# Patient Record
Sex: MALE | Race: WHITE | Employment: OTHER | URBAN - METROPOLITAN AREA
[De-identification: names, ages, dates, MRNs, and addresses within clinical notes are randomized per-mention and may not be internally consistent; named-entity substitution may affect disease eponyms.]

---

## 2024-06-22 ENCOUNTER — HOSPITAL ENCOUNTER (EMERGENCY)
Age: 74
Discharge: HOME | End: 2024-06-22
Attending: EMERGENCY MEDICINE
Payer: COMMERCIAL

## 2024-06-22 VITALS
HEART RATE: 72 BPM | HEIGHT: 71 IN | RESPIRATION RATE: 18 BRPM | WEIGHT: 210 LBS | BODY MASS INDEX: 29.4 KG/M2 | DIASTOLIC BLOOD PRESSURE: 85 MMHG | SYSTOLIC BLOOD PRESSURE: 133 MMHG | OXYGEN SATURATION: 100 % | TEMPERATURE: 97.1 F

## 2024-06-22 DIAGNOSIS — K44.9 HIATAL HERNIA: ICD-10-CM

## 2024-06-22 DIAGNOSIS — N17.9 AKI (ACUTE KIDNEY INJURY) (HCC): ICD-10-CM

## 2024-06-22 DIAGNOSIS — K57.90 DIVERTICULOSIS: ICD-10-CM

## 2024-06-22 DIAGNOSIS — N20.1 LEFT URETERAL STONE: Primary | ICD-10-CM

## 2024-06-22 LAB
ALBUMIN SERPL-MCNC: 4.1 G/DL (ref 3.5–5.2)
ALP SERPL-CCNC: 82 U/L (ref 40–129)
ALT SERPL-CCNC: 14 U/L (ref 0–40)
ANION GAP SERPL CALCULATED.3IONS-SCNC: 11 MMOL/L (ref 7–16)
AST SERPL-CCNC: 19 U/L (ref 0–39)
BASOPHILS # BLD: 0.04 K/UL (ref 0–0.2)
BASOPHILS NFR BLD: 1 % (ref 0–2)
BILIRUB SERPL-MCNC: 0.3 MG/DL (ref 0–1.2)
BILIRUB UR QL STRIP: NEGATIVE
BUN SERPL-MCNC: 20 MG/DL (ref 6–23)
CALCIUM SERPL-MCNC: 8.9 MG/DL (ref 8.6–10.2)
CHLORIDE SERPL-SCNC: 106 MMOL/L (ref 98–107)
CLARITY UR: CLEAR
CO2 SERPL-SCNC: 23 MMOL/L (ref 22–29)
COLOR UR: YELLOW
CREAT SERPL-MCNC: 1.4 MG/DL (ref 0.7–1.2)
EKG ATRIAL RATE: 59 BPM
EKG P AXIS: 71 DEGREES
EKG P-R INTERVAL: 178 MS
EKG Q-T INTERVAL: 414 MS
EKG QRS DURATION: 98 MS
EKG QTC CALCULATION (BAZETT): 409 MS
EKG R AXIS: 76 DEGREES
EKG T AXIS: 45 DEGREES
EKG VENTRICULAR RATE: 59 BPM
EOSINOPHIL # BLD: 0.29 K/UL (ref 0.05–0.5)
EOSINOPHILS RELATIVE PERCENT: 5 % (ref 0–6)
ERYTHROCYTE [DISTWIDTH] IN BLOOD BY AUTOMATED COUNT: 13.8 % (ref 11.5–15)
GFR, ESTIMATED: 53 ML/MIN/1.73M2
GLUCOSE SERPL-MCNC: 92 MG/DL (ref 74–99)
GLUCOSE UR STRIP-MCNC: NEGATIVE MG/DL
HCT VFR BLD AUTO: 42.5 % (ref 37–54)
HGB BLD-MCNC: 13.8 G/DL (ref 12.5–16.5)
HGB UR QL STRIP.AUTO: NEGATIVE
IMM GRANULOCYTES # BLD AUTO: <0.03 K/UL (ref 0–0.58)
IMM GRANULOCYTES NFR BLD: 0 % (ref 0–5)
KETONES UR STRIP-MCNC: NEGATIVE MG/DL
LACTATE BLDV-SCNC: 1 MMOL/L (ref 0.5–2.2)
LEUKOCYTE ESTERASE UR QL STRIP: NEGATIVE
LIPASE SERPL-CCNC: 26 U/L (ref 13–60)
LYMPHOCYTES NFR BLD: 1.19 K/UL (ref 1.5–4)
LYMPHOCYTES RELATIVE PERCENT: 21 % (ref 20–42)
MCH RBC QN AUTO: 29.2 PG (ref 26–35)
MCHC RBC AUTO-ENTMCNC: 32.5 G/DL (ref 32–34.5)
MCV RBC AUTO: 89.9 FL (ref 80–99.9)
MONOCYTES NFR BLD: 0.62 K/UL (ref 0.1–0.95)
MONOCYTES NFR BLD: 11 % (ref 2–12)
NEUTROPHILS NFR BLD: 61 % (ref 43–80)
NEUTS SEG NFR BLD: 3.4 K/UL (ref 1.8–7.3)
NITRITE UR QL STRIP: NEGATIVE
PH UR STRIP: 6 [PH] (ref 5–9)
PLATELET # BLD AUTO: 235 K/UL (ref 130–450)
PMV BLD AUTO: 8.8 FL (ref 7–12)
POTASSIUM SERPL-SCNC: 3.7 MMOL/L (ref 3.5–5)
PROT SERPL-MCNC: 7.1 G/DL (ref 6.4–8.3)
PROT UR STRIP-MCNC: NEGATIVE MG/DL
RBC # BLD AUTO: 4.73 M/UL (ref 3.8–5.8)
RBC #/AREA URNS HPF: NORMAL /HPF
SODIUM SERPL-SCNC: 140 MMOL/L (ref 132–146)
SP GR UR STRIP: 1.02 (ref 1–1.03)
TROPONIN I SERPL HS-MCNC: 11 NG/L (ref 0–11)
UROBILINOGEN UR STRIP-ACNC: 0.2 EU/DL (ref 0–1)
WBC #/AREA URNS HPF: NORMAL /HPF
WBC OTHER # BLD: 5.6 K/UL (ref 4.5–11.5)

## 2024-06-22 PROCEDURE — 99284 EMERGENCY DEPT VISIT MOD MDM: CPT

## 2024-06-22 PROCEDURE — 2580000003 HC RX 258: Performed by: STUDENT IN AN ORGANIZED HEALTH CARE EDUCATION/TRAINING PROGRAM

## 2024-06-22 PROCEDURE — 80053 COMPREHEN METABOLIC PANEL: CPT

## 2024-06-22 PROCEDURE — 85025 COMPLETE CBC W/AUTO DIFF WBC: CPT

## 2024-06-22 PROCEDURE — 81001 URINALYSIS AUTO W/SCOPE: CPT

## 2024-06-22 PROCEDURE — 84484 ASSAY OF TROPONIN QUANT: CPT

## 2024-06-22 PROCEDURE — 93005 ELECTROCARDIOGRAM TRACING: CPT | Performed by: STUDENT IN AN ORGANIZED HEALTH CARE EDUCATION/TRAINING PROGRAM

## 2024-06-22 PROCEDURE — 83690 ASSAY OF LIPASE: CPT

## 2024-06-22 PROCEDURE — 83605 ASSAY OF LACTIC ACID: CPT

## 2024-06-22 RX ORDER — 0.9 % SODIUM CHLORIDE 0.9 %
1000 INTRAVENOUS SOLUTION INTRAVENOUS ONCE
Status: COMPLETED | OUTPATIENT
Start: 2024-06-22 | End: 2024-06-22

## 2024-06-22 RX ORDER — TAMSULOSIN HYDROCHLORIDE 0.4 MG/1
0.4 CAPSULE ORAL DAILY
Qty: 30 CAPSULE | Refills: 0 | Status: SHIPPED | OUTPATIENT
Start: 2024-06-22

## 2024-06-22 RX ADMIN — SODIUM CHLORIDE 1000 ML: 9 INJECTION, SOLUTION INTRAVENOUS at 18:54

## 2024-06-22 ASSESSMENT — LIFESTYLE VARIABLES
HOW OFTEN DO YOU HAVE A DRINK CONTAINING ALCOHOL: NEVER
HOW MANY STANDARD DRINKS CONTAINING ALCOHOL DO YOU HAVE ON A TYPICAL DAY: PATIENT DOES NOT DRINK

## 2024-06-22 NOTE — ED PROVIDER NOTES
Name: Lencho Keller    MRN: [id number]     Date / Time Roomed:  6/22/2024  4:08 PM  ED Bed Assignment:  SGWR/SG-WR    ------------------ History of Present Illness --------------------  6/22/24, Time: 4:18 PM EDT   Chief Complaint   Patient presents with    Abnormal CT     had CT in [place] this am   obstructing stone on left   told to go to ER      HPI    Lencho Keller is a 73 y.o. male,, who presents to the ED today for left-sided flank pain over the past 2 or so months.  He states he was sent by his primary care doctor to get a CT scan performed this afternoon over at [place].  He states that his primary care doctor told him he needed to come the hospital after he had the CT scan done as it showed an obstructing left-sided kidney stone.  He states his pain is only mild at this time.  He relates having left flank pain ongoing over the past 2 months or so.  He states he has gotten used to the pain.  Denies any nausea vomiting, denies any chest pain or shortness of breath.  Denies any fever.  Denies urinary complaint.  Denies any blood in the urine or stool.  Denies diarrhea or constipation. The pt denies other ROS at this time.     PCP: No primary care provider on file..    -------------------- PMH --------------------    Past Medical History:   has no past medical history on file.     Surgical History:  No past surgical history on file.    Social History:       Family History:  No family history on file.    Allergies:  Patient has no known allergies.    The patient’s past medical history has been reviewed.    -------------------- Current Meds --------------------  Discharge Medication List as of 6/22/2024  8:30 PM          The patient’s home medications have been reviewed.    -------------------- PE --------------------  Physical Exam  Vitals and nursing note reviewed.   Constitutional:       General: He is not in acute distress.     Appearance: Normal appearance. He is not ill-appearing, toxic-appearing or diaphoretic.    06/22/24 1534 133/85 -- --         ------------- Final Impression, Disposition & Plan ---------------    Final Impression:   1. Left ureteral stone    2. Diverticulosis    3. Hiatal hernia    4. FRANSISCA (acute kidney injury) (Formerly [facility])        Disposition:  Decision To Discharge 06/22/2024 08:28:31 PM    PATIENT REFERRED TO:  Enrike Robbins MD  [address] [facility] [place] [address] [phone]    Call in 2 days  For follow up    Your PCP    Call in 1 day  For follow up      DISCHARGE MEDICATIONS:  Discharge Medication List as of 6/22/2024  8:30 PM        START taking these medications    Details   tamsulosin (FLOMAX) 0.4 MG capsule Take 1 capsule by mouth daily, Disp-30 capsule, R-0Print                  Please note that portions of this note were completed with a voice recognition program.    Efforts were made to edit the dictations but occasionally words are mis-transcribed.    Nikos Peña DO (electronically signed)

## 2024-06-22 NOTE — DISCHARGE INSTRUCTIONS
Please follow-up with urology listed in your paperwork on Monday by calling their office and schedule an appointment for earlier this coming week.  Please return to the hospital for any new or worsening symptoms    Please stay well-hydrated.        Take motrin as needed for pain. Take with food to avoid stomach upset. Take norco as needed for severe pain. Do not drive or work while taking pain medication because it can make your drowsy. Do not mix pain medication with alcohol. Take flomax as prescribed until stone passed. Follow up with urology. Return to the emergency department immediately if uncontrolled pain, fever of 100.4 F or greater, or nausea and vomiting with inability to keep anything down.

## 2024-06-24 LAB
EKG ATRIAL RATE: 59 BPM
EKG P AXIS: 71 DEGREES
EKG P-R INTERVAL: 178 MS
EKG Q-T INTERVAL: 414 MS
EKG QRS DURATION: 98 MS
EKG QTC CALCULATION (BAZETT): 409 MS
EKG R AXIS: 76 DEGREES
EKG T AXIS: 45 DEGREES
EKG VENTRICULAR RATE: 59 BPM

## 2024-06-24 PROCEDURE — 93010 ELECTROCARDIOGRAM REPORT: CPT | Performed by: INTERNAL MEDICINE

## 2024-06-25 ENCOUNTER — HOSPITAL ENCOUNTER (OUTPATIENT)
Dept: GENERAL RADIOLOGY | Age: 74
Discharge: HOME | End: 2024-06-27
Payer: MEDICARE

## 2024-06-25 ENCOUNTER — ANESTHESIA EVENT (OUTPATIENT)
Dept: OPERATING ROOM | Age: 74
End: 2024-06-25
Payer: MEDICARE

## 2024-06-25 ENCOUNTER — PREP FOR PROCEDURE (OUTPATIENT)
Dept: UROLOGY | Age: 74
End: 2024-06-25

## 2024-06-25 ENCOUNTER — HOSPITAL ENCOUNTER (OUTPATIENT)
Age: 74
Discharge: HOME | End: 2024-06-27
Payer: MEDICARE

## 2024-06-25 DIAGNOSIS — N13.2 HYDRONEPHROSIS WITH RENAL AND URETERAL CALCULUS OBSTRUCTION: ICD-10-CM

## 2024-06-25 PROCEDURE — 74018 RADEX ABDOMEN 1 VIEW: CPT

## 2024-06-25 RX ORDER — SODIUM CHLORIDE 0.9 % (FLUSH) 0.9 %
5-40 SYRINGE (ML) INJECTION PRN
Status: CANCELLED | OUTPATIENT
Start: 2024-06-25

## 2024-06-25 RX ORDER — SODIUM CHLORIDE 9 MG/ML
INJECTION, SOLUTION INTRAVENOUS PRN
Status: CANCELLED | OUTPATIENT
Start: 2024-06-25

## 2024-06-25 RX ORDER — ROSUVASTATIN CALCIUM 10 MG/1
10 TABLET, COATED ORAL DAILY
COMMUNITY

## 2024-06-25 RX ORDER — SERTRALINE HYDROCHLORIDE 100 MG/1
100 TABLET, FILM COATED ORAL NIGHTLY
COMMUNITY

## 2024-06-25 RX ORDER — SODIUM CHLORIDE 0.9 % (FLUSH) 0.9 %
5-40 SYRINGE (ML) INJECTION EVERY 12 HOURS SCHEDULED
Status: CANCELLED | OUTPATIENT
Start: 2024-06-25

## 2024-06-25 RX ORDER — SODIUM CHLORIDE 9 MG/ML
INJECTION, SOLUTION INTRAVENOUS CONTINUOUS
Status: CANCELLED | OUTPATIENT
Start: 2024-06-25

## 2024-06-25 NOTE — PROGRESS NOTES
[facility] PRE-ADMISSION TESTING INSTRUCTIONS    The Preadmission Testing patient is instructed accordingly using the following criteria (check applicable):    ARRIVAL INSTRUCTIONS:  [x] Parking the day of Surgery is located in the Main Entrance lot.  Upon entering the door, make an immediate right to the surgery reception desk    [x] Bring photo ID and insurance card    [x] Bring in a copy of Living will or Durable Power of  papers.    [x] Please be sure to arrange for a responsible adult to provide transportation to and from the hospital    [x] Please arrange for a responsible adult to be with you for the 24 hour period post procedure due to having anesthesia    [x] If you awake am of surgery not feeling well or have temperature >100 please call [phone]    GENERAL INSTRUCTIONS:    [x] No solid foods after midnight, may have up to 8oz of water until 2 hours prior to arrival time. No gum, no candy, no mints.        [x] You may brush your teeth, do not swallow any toothpaste    [] Take medications as instructed     [x] No herbal supplements and vitamins 5 days prior to procedure    [] Follow preop dosing of blood thinners per physician instructions    [] Take 1/2 dose of evening insulin, but no insulin after midnight    [] No oral diabetic medications after midnight    [] If diabetic and have low blood sugar or feel symptomatic, take 1-2oz apple juice only    [] Bring inhalers day of surgery    [] Bring urine specimen day of surgery    [x] Shower or bath with soap, lather and rinse well, AM of Surgery, no lotion, powders or creams to surgical site    [] Follow bowel prep as instructed per surgeon    [x] No tobacco products within 24 hours of surgery     [x] No alcohol or illegal drug use, marijuana included, within 24 hours of surgery.    [x] Jewelry, body piercing's, eyeglasses, contact lenses and dentures are not permitted into surgery (bring cases)      [] Please do not wear

## 2024-06-26 ENCOUNTER — HOSPITAL ENCOUNTER (OUTPATIENT)
Age: 74
Setting detail: OUTPATIENT SURGERY
Discharge: HOME | End: 2024-06-26
Attending: STUDENT IN AN ORGANIZED HEALTH CARE EDUCATION/TRAINING PROGRAM | Admitting: STUDENT IN AN ORGANIZED HEALTH CARE EDUCATION/TRAINING PROGRAM
Payer: MEDICARE

## 2024-06-26 ENCOUNTER — ANESTHESIA (OUTPATIENT)
Dept: OPERATING ROOM | Age: 74
End: 2024-06-26
Payer: MEDICARE

## 2024-06-26 VITALS
WEIGHT: 212 LBS | HEART RATE: 74 BPM | DIASTOLIC BLOOD PRESSURE: 74 MMHG | TEMPERATURE: 97.4 F | OXYGEN SATURATION: 95 % | RESPIRATION RATE: 18 BRPM | BODY MASS INDEX: 29.68 KG/M2 | SYSTOLIC BLOOD PRESSURE: 138 MMHG | HEIGHT: 71 IN

## 2024-06-26 DIAGNOSIS — N20.1 CALCULUS OF URETER: Primary | ICD-10-CM

## 2024-06-26 LAB
MICROORGANISM SPEC CULT: NO GROWTH
SERVICE CMNT-IMP: NORMAL
SPECIMEN DESCRIPTION: NORMAL

## 2024-06-26 PROCEDURE — 3700000000 HC ANESTHESIA ATTENDED CARE: Performed by: STUDENT IN AN ORGANIZED HEALTH CARE EDUCATION/TRAINING PROGRAM

## 2024-06-26 PROCEDURE — C1769 GUIDE WIRE: HCPCS | Performed by: STUDENT IN AN ORGANIZED HEALTH CARE EDUCATION/TRAINING PROGRAM

## 2024-06-26 PROCEDURE — 6360000002 HC RX W HCPCS: Performed by: NURSE ANESTHETIST, CERTIFIED REGISTERED

## 2024-06-26 PROCEDURE — 2500000003 HC RX 250 WO HCPCS: Performed by: NURSE ANESTHETIST, CERTIFIED REGISTERED

## 2024-06-26 PROCEDURE — 2580000003 HC RX 258: Performed by: NURSE PRACTITIONER

## 2024-06-26 PROCEDURE — 2500000003 HC RX 250 WO HCPCS

## 2024-06-26 PROCEDURE — C2617 STENT, NON-COR, TEM W/O DEL: HCPCS | Performed by: STUDENT IN AN ORGANIZED HEALTH CARE EDUCATION/TRAINING PROGRAM

## 2024-06-26 PROCEDURE — 6360000002 HC RX W HCPCS

## 2024-06-26 PROCEDURE — 6360000002 HC RX W HCPCS: Performed by: NURSE PRACTITIONER

## 2024-06-26 PROCEDURE — 7100000010 HC PHASE II RECOVERY - FIRST 15 MIN: Performed by: STUDENT IN AN ORGANIZED HEALTH CARE EDUCATION/TRAINING PROGRAM

## 2024-06-26 PROCEDURE — 2709999900 HC NON-CHARGEABLE SUPPLY: Performed by: STUDENT IN AN ORGANIZED HEALTH CARE EDUCATION/TRAINING PROGRAM

## 2024-06-26 PROCEDURE — 3600000012 HC SURGERY LEVEL 2 ADDTL 15MIN: Performed by: STUDENT IN AN ORGANIZED HEALTH CARE EDUCATION/TRAINING PROGRAM

## 2024-06-26 PROCEDURE — 3700000001 HC ADD 15 MINUTES (ANESTHESIA): Performed by: STUDENT IN AN ORGANIZED HEALTH CARE EDUCATION/TRAINING PROGRAM

## 2024-06-26 PROCEDURE — 7100000011 HC PHASE II RECOVERY - ADDTL 15 MIN: Performed by: STUDENT IN AN ORGANIZED HEALTH CARE EDUCATION/TRAINING PROGRAM

## 2024-06-26 PROCEDURE — 7100000000 HC PACU RECOVERY - FIRST 15 MIN: Performed by: STUDENT IN AN ORGANIZED HEALTH CARE EDUCATION/TRAINING PROGRAM

## 2024-06-26 PROCEDURE — 7100000001 HC PACU RECOVERY - ADDTL 15 MIN: Performed by: STUDENT IN AN ORGANIZED HEALTH CARE EDUCATION/TRAINING PROGRAM

## 2024-06-26 PROCEDURE — 3600000002 HC SURGERY LEVEL 2 BASE: Performed by: STUDENT IN AN ORGANIZED HEALTH CARE EDUCATION/TRAINING PROGRAM

## 2024-06-26 DEVICE — URETERAL STENT
Type: IMPLANTABLE DEVICE | Site: URETER | Status: FUNCTIONAL
Brand: PERCUFLEX™

## 2024-06-26 RX ORDER — SODIUM CHLORIDE 0.9 % (FLUSH) 0.9 %
5-40 SYRINGE (ML) INJECTION PRN
Status: DISCONTINUED | OUTPATIENT
Start: 2024-06-26 | End: 2024-06-26 | Stop reason: HOSPADM

## 2024-06-26 RX ORDER — NALOXONE HYDROCHLORIDE 0.4 MG/ML
INJECTION, SOLUTION INTRAMUSCULAR; INTRAVENOUS; SUBCUTANEOUS PRN
Status: DISCONTINUED | OUTPATIENT
Start: 2024-06-26 | End: 2024-06-26 | Stop reason: HOSPADM

## 2024-06-26 RX ORDER — PHENYLEPHRINE HCL IN 0.9% NACL 1 MG/10 ML
SYRINGE (ML) INTRAVENOUS PRN
Status: DISCONTINUED | OUTPATIENT
Start: 2024-06-26 | End: 2024-06-26 | Stop reason: SDUPTHER

## 2024-06-26 RX ORDER — SODIUM CHLORIDE 9 MG/ML
INJECTION, SOLUTION INTRAVENOUS PRN
Status: DISCONTINUED | OUTPATIENT
Start: 2024-06-26 | End: 2024-06-26 | Stop reason: HOSPADM

## 2024-06-26 RX ORDER — LABETALOL HYDROCHLORIDE 5 MG/ML
10 INJECTION, SOLUTION INTRAVENOUS
Status: DISCONTINUED | OUTPATIENT
Start: 2024-06-26 | End: 2024-06-26 | Stop reason: HOSPADM

## 2024-06-26 RX ORDER — OXYCODONE HYDROCHLORIDE AND ACETAMINOPHEN 5; 325 MG/1; MG/1
1 TABLET ORAL EVERY 6 HOURS PRN
Qty: 12 TABLET | Refills: 0 | Status: SHIPPED | OUTPATIENT
Start: 2024-06-26 | End: 2024-06-29

## 2024-06-26 RX ORDER — ONDANSETRON 2 MG/ML
INJECTION INTRAMUSCULAR; INTRAVENOUS PRN
Status: DISCONTINUED | OUTPATIENT
Start: 2024-06-26 | End: 2024-06-26 | Stop reason: SDUPTHER

## 2024-06-26 RX ORDER — ONDANSETRON 2 MG/ML
4 INJECTION INTRAMUSCULAR; INTRAVENOUS
Status: DISCONTINUED | OUTPATIENT
Start: 2024-06-26 | End: 2024-06-26 | Stop reason: HOSPADM

## 2024-06-26 RX ORDER — HYDRALAZINE HYDROCHLORIDE 20 MG/ML
10 INJECTION INTRAMUSCULAR; INTRAVENOUS
Status: DISCONTINUED | OUTPATIENT
Start: 2024-06-26 | End: 2024-06-26 | Stop reason: HOSPADM

## 2024-06-26 RX ORDER — LIDOCAINE HYDROCHLORIDE 20 MG/ML
INJECTION, SOLUTION EPIDURAL; INFILTRATION; INTRACAUDAL; PERINEURAL PRN
Status: DISCONTINUED | OUTPATIENT
Start: 2024-06-26 | End: 2024-06-26 | Stop reason: SDUPTHER

## 2024-06-26 RX ORDER — SODIUM CHLORIDE 0.9 % (FLUSH) 0.9 %
5-40 SYRINGE (ML) INJECTION EVERY 12 HOURS SCHEDULED
Status: DISCONTINUED | OUTPATIENT
Start: 2024-06-26 | End: 2024-06-26 | Stop reason: HOSPADM

## 2024-06-26 RX ORDER — MIDAZOLAM HYDROCHLORIDE 2 MG/2ML
2 INJECTION, SOLUTION INTRAMUSCULAR; INTRAVENOUS
Status: DISCONTINUED | OUTPATIENT
Start: 2024-06-26 | End: 2024-06-26 | Stop reason: HOSPADM

## 2024-06-26 RX ORDER — PROPOFOL 10 MG/ML
INJECTION, EMULSION INTRAVENOUS PRN
Status: DISCONTINUED | OUTPATIENT
Start: 2024-06-26 | End: 2024-06-26 | Stop reason: SDUPTHER

## 2024-06-26 RX ORDER — FENTANYL CITRATE 50 UG/ML
INJECTION, SOLUTION INTRAMUSCULAR; INTRAVENOUS PRN
Status: DISCONTINUED | OUTPATIENT
Start: 2024-06-26 | End: 2024-06-26 | Stop reason: SDUPTHER

## 2024-06-26 RX ORDER — DEXAMETHASONE SODIUM PHOSPHATE 4 MG/ML
INJECTION, SOLUTION INTRA-ARTICULAR; INTRALESIONAL; INTRAMUSCULAR; INTRAVENOUS; SOFT TISSUE PRN
Status: DISCONTINUED | OUTPATIENT
Start: 2024-06-26 | End: 2024-06-26 | Stop reason: SDUPTHER

## 2024-06-26 RX ORDER — IPRATROPIUM BROMIDE AND ALBUTEROL SULFATE 2.5; .5 MG/3ML; MG/3ML
1 SOLUTION RESPIRATORY (INHALATION)
Status: DISCONTINUED | OUTPATIENT
Start: 2024-06-26 | End: 2024-06-26 | Stop reason: HOSPADM

## 2024-06-26 RX ORDER — EPHEDRINE SULFATE/0.9% NACL/PF 25 MG/5 ML
SYRINGE (ML) INTRAVENOUS PRN
Status: DISCONTINUED | OUTPATIENT
Start: 2024-06-26 | End: 2024-06-26 | Stop reason: SDUPTHER

## 2024-06-26 RX ORDER — GLYCOPYRROLATE 0.2 MG/ML
INJECTION INTRAMUSCULAR; INTRAVENOUS PRN
Status: DISCONTINUED | OUTPATIENT
Start: 2024-06-26 | End: 2024-06-26 | Stop reason: SDUPTHER

## 2024-06-26 RX ORDER — SODIUM CHLORIDE 9 MG/ML
INJECTION, SOLUTION INTRAVENOUS CONTINUOUS
Status: DISCONTINUED | OUTPATIENT
Start: 2024-06-26 | End: 2024-06-26 | Stop reason: HOSPADM

## 2024-06-26 RX ADMIN — Medication 200 MCG: at 14:15

## 2024-06-26 RX ADMIN — Medication 100 MCG: at 13:47

## 2024-06-26 RX ADMIN — FENTANYL CITRATE 50 MCG: 50 INJECTION, SOLUTION INTRAMUSCULAR; INTRAVENOUS at 13:40

## 2024-06-26 RX ADMIN — Medication 100 MCG: at 13:50

## 2024-06-26 RX ADMIN — GLYCOPYRROLATE 0.2 MG: 0.2 INJECTION INTRAMUSCULAR; INTRAVENOUS at 13:46

## 2024-06-26 RX ADMIN — PROPOFOL 200 MG: 10 INJECTION, EMULSION INTRAVENOUS at 13:33

## 2024-06-26 RX ADMIN — LIDOCAINE HYDROCHLORIDE 100 MG: 20 INJECTION, SOLUTION EPIDURAL; INFILTRATION; INTRACAUDAL; PERINEURAL at 13:33

## 2024-06-26 RX ADMIN — SODIUM CHLORIDE: 9 INJECTION, SOLUTION INTRAVENOUS at 13:51

## 2024-06-26 RX ADMIN — EPHEDRINE SULFATE 10 MG: 5 INJECTION INTRAVENOUS at 13:55

## 2024-06-26 RX ADMIN — WATER 2000 MG: 1 INJECTION INTRAMUSCULAR; INTRAVENOUS; SUBCUTANEOUS at 13:25

## 2024-06-26 RX ADMIN — Medication 200 MCG: at 14:08

## 2024-06-26 RX ADMIN — DEXAMETHASONE SODIUM PHOSPHATE 10 MG: 4 INJECTION, SOLUTION INTRAMUSCULAR; INTRAVENOUS at 13:37

## 2024-06-26 RX ADMIN — ONDANSETRON 4 MG: 2 INJECTION INTRAMUSCULAR; INTRAVENOUS at 13:37

## 2024-06-26 RX ADMIN — SODIUM CHLORIDE: 9 INJECTION, SOLUTION INTRAVENOUS at 13:25

## 2024-06-26 RX ADMIN — Medication 200 MCG: at 14:03

## 2024-06-26 ASSESSMENT — PAIN DESCRIPTION - DESCRIPTORS: DESCRIPTORS: ACHING;SHARP

## 2024-06-26 ASSESSMENT — PAIN - FUNCTIONAL ASSESSMENT: PAIN_FUNCTIONAL_ASSESSMENT: 0-10

## 2024-06-26 NOTE — DISCHARGE INSTRUCTIONS
constipation, you may try taking Milk of Magnesia.   You may use over the counter medicine, such a Gas-X, if you experience gas pains after surgery.     Ureteral Stent: Most people experience some discomfort with a stent in place. Common symptoms include back pain, urinary frequency and urgency. You may see some blood off and on in your urine, especially after you are active. While these symptoms are common with a stent, if you are having a lot of pain, please call our office to discuss your symptoms.     Showering: You may shower when you get home from the hospital. Please avoid swimming, hot tubs and baths for 2-3 weeks after your procedure.     Pain Medications: Your doctor will prescribe the appropriate pain medication for you. Take these pills only as directed and only if you need them. If you are experiencing mild discomfort, you may take Tylenol or Ibuprofen.  NEVER mix alcohol with prescription pain medications.   Pain medication may make you drowsy. Do not take pain medication when doing any activity that requires coordination, such as driving.     Infection: Report the following warning signs of infection to your doctor immediately:  A temperature of 101 degrees or greater  Unable to urinate.  Sudden onset of increased pain or tenderness   Increased amount of blood in the urine, or passing large blood clots    Miscellaneous  It is normal for you to have minor discomfort after your surgery. However, if there are any significant changes in your condition--such as shortness of breath, difficulty breathing, or pain or uneven swelling in your legs--please go to the Emergency Room.    Return to Work: If you work in an office and are not lifting or doing strenuous activity, you may return to work when comfortable. If your work involves strenuous activity, you may need more time before returning to work.   If necessary, our office can provide a letter stating the date that you may return to work.    Follow-up

## 2024-06-26 NOTE — OP NOTE
Operative Note      Patient: Lencho Keller  YOB: 1950  MRN: [id number]    Date of Procedure: 6/26/2024    Pre-Op Diagnosis Codes:     * Uric acid nephrolithiasis [N20.0]    Post-Op Diagnosis: Same       Procedure(s):  LEFT EXTRACORPOREAL SHOCK WAVE LITHOTRIPSY WITH LEFT STENT INSERTION    Surgeon(s):  True Busch MD    Assistant:   * No surgical staff found *    Anesthesia: General    Estimated Blood Loss (mL): Minimal    Complications: None    Specimens:   * No specimens in log *    Implants:  Implant Name Type Inv. Item Serial No.  Lot No. LRB No. Used Action   STENT URET L26CM OD48FR PERCFLX DBL PGTL FLX TIP THRD W O - [id number]  STENT URET L26CM OD48FR PERCFLX DBL PGTL FLX TIP THRD W O  [employer] UROLOGY- [id number] Left 1 Implanted         Drains:   Ureteral Drain/Stent 06/26/24 Left Ureter (Active)       Findings:  Infection Present At Time Of Surgery (PATOS) (choose all levels that have infection present):  No infection present  Other Findings: see op note    Detailed Description of Procedure:         72 yo  who presents with left  renal calculus. Pt was counseled on all of the different treatment options. All R/B/ A were discussed with the patient. Informed consent was obtained and signed    Operation:     Pt was wheeled back into the operative suite. Identified by name band and number. Pt was then transferred onto the operative table. Anesthesia was delivered without complication. Surgical time out was taken and all in the room were in agreement.     Pt was placed in the dorsal lithotomy position prepped and draped in normal sterile fashion. A 0.035 glide wire was then placed into the left  ureteral oriface. This was seen to terminate in the kidney on KUB. A 4.8 x 26 JJ stent was then placed and found to have good curl in the renal pelvis and the urinary bladder. The bladder was then drained.     The lithotriper was moved into position.   The stone was identified.   A total of

## 2024-06-26 NOTE — ANESTHESIA PRE PROCEDURE
Department of Anesthesiology  Preprocedure Note       Name:  Lencho Keller   Age:  73 y.o.  :  1950                                          MRN:  [id number]         Date:  2024      Surgeon: Surgeon(s):  True Busch MD    Procedure: Procedure(s):  LEFT EXTRACORPOREAL SHOCK WAVE LITHOTRIPSY WITH LEFT STENT INSERTION    Medications prior to admission:   Prior to Admission medications    Medication Sig Start Date End Date Taking? Authorizing Provider   sertraline (ZOLOFT) 100 MG tablet Take 1 tablet by mouth nightly   Yes Provider, MD Miguelina   rosuvastatin (CRESTOR) 10 MG tablet Take 1 tablet by mouth daily   Yes Provider, Miguelina, MD   tamsulosin (FLOMAX) 0.4 MG capsule Take 1 capsule by mouth daily 24   Nikos Peña DO       Current medications:    Current Facility-Administered Medications   Medication Dose Route Frequency Provider Last Rate Last Admin    0.9 % sodium chloride infusion   IntraVENous Continuous Rosa Causey APRN - CNP        sodium chloride flush 0.9 % injection 5-40 mL  5-40 mL IntraVENous 2 times per day Rosa Causey APRN - CNP        sodium chloride flush 0.9 % injection 5-40 mL  5-40 mL IntraVENous PRN Rosa Causey APRN - CNP        0.9 % sodium chloride infusion   IntraVENous PRN Rosa Causey APRN - CNP        ceFAZolin (ANCEF) 2,000 mg in sterile water 20 mL IV syringe  2,000 mg IntraVENous On Call to OR Rosa Causey APRN - CNP           Allergies:  No Known Allergies    Problem List:  There is no problem list on file for this patient.      Past Medical History:        Diagnosis Date    Anxiety and depression     Hyperlipidemia     Kidney stone        Past Surgical History:        Procedure Laterality Date    CARPAL TUNNEL RELEASE Bilateral        Social History:    Social History     Tobacco Use    Smoking status: Never    Smokeless tobacco: Current     Types: Chew    Tobacco comments:     2-3 times daily   Substance Use Topics

## 2024-06-26 NOTE — H&P
Lencho Keller is a 73 y.o. male with Left UPJ calculus.   Here for left ESWl and stent.   No new changes.   Please see paper h and p for full details.     Past Medical History:   Diagnosis Date    Anxiety and depression     Hyperlipidemia     Kidney stone        Past Surgical History:   Procedure Laterality Date    CARPAL TUNNEL RELEASE Bilateral        History reviewed. No pertinent family history.    Prior to Admission medications    Medication Sig Start Date End Date Taking? Authorizing Provider   sertraline (ZOLOFT) 100 MG tablet Take 1 tablet by mouth nightly   Yes Provider, MD Miguelina   rosuvastatin (CRESTOR) 10 MG tablet Take 1 tablet by mouth daily   Yes Provider, MD Miguelina   tamsulosin (FLOMAX) 0.4 MG capsule Take 1 capsule by mouth daily 6/22/24   Nikos Peña DO        Allergies: Patient has no known allergies.    Social History     Tobacco Use    Smoking status: Never    Smokeless tobacco: Current     Types: Chew    Tobacco comments:     2-3 times daily   Substance Use Topics    Alcohol use: Never        Review of Systems:  Respiratory: negative for cough and hemoptysis  Cardiovascular: negative for chest pain and dyspnea  Gastrointestinal: negative for abdominal pain, diarrhea, nausea and vomiting  Genitourinary: as per HPI, otherwise negative.  Derm: negative for rash and skin lesion(s)  Neurological: negative for seizures and tremors  Endocrine: negative for diabetic symptoms including polydipsia and polyuria  All other systems negative    Physical Exam:  Vitals:    06/26/24 1144   BP: 131/72   Pulse: 58   Resp: 16   Temp: 97.7 °F (36.5 °C)   SpO2: 93%      Skin:  Warm and dry.  No rash or bruises  HEENT:  PERRLA, EOMI  Neck:  No JVD, No thyromegaly  Cardiac:  RRR  Lungs:  No audible wheezes, symmetric respirations, non-labored  Abdomen: Soft, non-tender, non-distended  Extremities:  No clubbing, edema or cyanosis  Neurological:  Moves all extremities, normal DTR    Lab Results

## 2024-06-26 NOTE — ANESTHESIA POSTPROCEDURE EVALUATION
Department of Anesthesiology  Postprocedure Note    Patient: Lencho Keller  MRN: [id number]  YOB: 1950  Date of evaluation: 6/26/2024    Procedure Summary       Date: 06/26/24 Room / Location: [address]    Anesthesia Start: 1325 Anesthesia Stop: 1431    Procedure: LEFT EXTRACORPOREAL SHOCK WAVE LITHOTRIPSY WITH LEFT STENT INSERTION (Left: Ureter) Diagnosis:       Uric acid nephrolithiasis      (Uric acid nephrolithiasis [N20.0])    Surgeons: True Busch MD Responsible Provider: Temitope Malcolm DO    Anesthesia Type: general ASA Status: 2            Anesthesia Type: No value filed.    Josiah Phase I: Josiah Score: 10    Josiah Phase II:      Anesthesia Post Evaluation    Patient location during evaluation: PACU  Patient participation: complete - patient participated  Level of consciousness: awake and alert  Pain score: 0  Airway patency: patent  Nausea & Vomiting: no nausea and no vomiting  Cardiovascular status: blood pressure returned to baseline  Respiratory status: acceptable  Hydration status: euvolemic        No notable events documented.

## 2024-07-01 ENCOUNTER — HOSPITAL ENCOUNTER (OUTPATIENT)
Age: 74
Discharge: HOME | End: 2024-07-03
Payer: MEDICARE

## 2024-07-01 ENCOUNTER — HOSPITAL ENCOUNTER (OUTPATIENT)
Dept: GENERAL RADIOLOGY | Age: 74
Discharge: HOME | End: 2024-07-03
Payer: MEDICARE

## 2024-07-01 DIAGNOSIS — N20.0 CALCULUS OF KIDNEY: ICD-10-CM

## 2024-07-01 PROCEDURE — 74018 RADEX ABDOMEN 1 VIEW: CPT

## 2024-07-23 ENCOUNTER — HOSPITAL ENCOUNTER (OUTPATIENT)
Age: 74
Discharge: HOME | End: 2024-07-25

## 2024-07-23 PROCEDURE — 87081 CULTURE SCREEN ONLY: CPT

## 2024-07-25 LAB
MICROORGANISM SPEC CULT: NORMAL
SPECIMEN DESCRIPTION: NORMAL

## 2024-07-29 ENCOUNTER — HOSPITAL ENCOUNTER (OUTPATIENT)
Age: 74
Discharge: HOME | End: 2024-07-31

## 2024-07-29 PROCEDURE — 82365 CALCULUS SPECTROSCOPY: CPT

## 2024-08-04 LAB
STONE COMPOSITION: NORMAL
STONE DESCRIPTION: NORMAL
STONE MASS: 449 MG

## 2024-08-05 LAB — SURGICAL PATHOLOGY REPORT: NORMAL

## (undated) DEVICE — TUBING, SUCTION, 3/16" X 12', STRAIGHT: Brand: MEDLINE

## (undated) DEVICE — SOLUTION IRRIG 1000ML STRL H2O USP PLAS POUR BTL

## (undated) DEVICE — SOLUTION SCRB 4OZ 4% CHG H2O AIDED FOR PREOPERATIVE SKIN

## (undated) DEVICE — GAUZE,SPONGE,4"X4",8PLY,STRL,LF,10/TRAY: Brand: MEDLINE

## (undated) DEVICE — CYSTO PACK: Brand: MEDLINE INDUSTRIES, INC.

## (undated) DEVICE — SOLUTION IV 1000ML 0.9% SOD CHL PH 5 INJ USP VIAFLX PLAS

## (undated) DEVICE — SYRINGE MED 10ML POLYPR LUERSLIP TIP FLAT TOP W/O SFTY DISP

## (undated) DEVICE — GOWN,SIRUS,FABRNF,2XL,18/CS: Brand: MEDLINE

## (undated) DEVICE — 4-PORT MANIFOLD: Brand: NEPTUNE 2

## (undated) DEVICE — HOSE CONN FOR WST MGMT SYS NEPTUNE 2

## (undated) DEVICE — GLOVE ORANGE PI 8   MSG9080

## (undated) DEVICE — BAG DRNGE COMB PK

## (undated) DEVICE — GUIDEWIRE UROLOGICAL STR STD 0.035 IN X150 CM (QTY = BOX OF 5)